# Patient Record
(demographics unavailable — no encounter records)

---

## 2025-05-16 NOTE — PHYSICAL EXAM
[No Supraclavicular Adenopathy] : no supraclavicular adenopathy [No Cervical Adenopathy] : no cervical adenopathy [Clear to Auscultation Bilat] : clear to auscultation bilaterally [Examined in the supine and seated position] : examined in the supine and seated position [Asymmetrical] : asymmetrical [No dominant masses] : no dominant masses in right breast  [No dominant masses] : no dominant masses left breast [No Nipple Retraction] : no left nipple retraction [No Nipple Discharge] : no left nipple discharge [No Axillary Lymphadenopathy] : no left axillary lymphadenopathy [Soft] : abdomen soft [Not Tender] : non-tender [No Hepato-Splenomegaly] : no hepato-splenomegaly [No Rashes] : no rashes [de-identified] : Status post right modified radical mastectomy with TRAM flap reconstruction, nonnipple sparing.

## 2025-05-16 NOTE — HISTORY OF PRESENT ILLNESS
[FreeTextEntry1] : 2/14 right modified radical mastectomy with TRAM flap reconstruction ER WI negative, HER-2 positive invasive ductal carcinoma with 1 microscopic node/23 nodes T1 BN 1 ANT M0, stage IIa Chemotherapy ACTH and postmastectomy radiation Originally gene tested negative then 1/20 up testing showed NT H L1 variant of unknown significance  Patient denies any breast masses or bone pain.    Patient originally presented with bilateral bloody spontaneous nipple discharge while she was breast-feeding.  She underwent bilateral major duct excisions which revealed benign changes on the left but on the right she had a multifocal ER/WI negative, HER-2 positive infiltrating ductal carcinoma with positive margins.  In February 2014 she underwent a right modified radical mastectomy with TRAM flap reconstruction.  The pathology revealed residual DCIS and micrometastasis to 1 of 23 nodes for a final stage of 2A.  She was treated with ACTH chemotherapy and had postmastectomy radiotherapy issues.  Her medical oncologist is Dr. Schmidt.  She has no family history of breast or ovarian cancer.

## 2025-05-16 NOTE — REASON FOR VISIT
[Follow-Up: _____] : a [unfilled] follow-up visit [FreeTextEntry1] : Status post right modified radical mastectomy